# Patient Record
Sex: FEMALE | Race: WHITE | Employment: UNEMPLOYED | URBAN - METROPOLITAN AREA
[De-identification: names, ages, dates, MRNs, and addresses within clinical notes are randomized per-mention and may not be internally consistent; named-entity substitution may affect disease eponyms.]

---

## 2017-01-03 PROBLEM — L05.01 PILONIDAL CYST WITH ABSCESS: Status: ACTIVE | Noted: 2017-01-03

## 2017-01-04 ENCOUNTER — OFFICE VISIT (OUTPATIENT)
Dept: SURGERY | Age: 22
End: 2017-01-04

## 2017-01-04 VITALS
WEIGHT: 159 LBS | DIASTOLIC BLOOD PRESSURE: 71 MMHG | BODY MASS INDEX: 26.49 KG/M2 | HEIGHT: 65 IN | RESPIRATION RATE: 20 BRPM | SYSTOLIC BLOOD PRESSURE: 109 MMHG | HEART RATE: 87 BPM | TEMPERATURE: 98.6 F | OXYGEN SATURATION: 98 %

## 2017-01-04 DIAGNOSIS — Z09 POSTOPERATIVE EXAMINATION: Primary | ICD-10-CM

## 2017-01-04 NOTE — PROGRESS NOTES
The patient is status post incision and drainage of infected pilonidal cyst.  She has no complaints. She reports the area feels much better and only has minimal discomfort with prolonged sitting at present. Packing was removed as requested four days ago. On examination the site appears much less inflamed. There is minimal tenderness. There is no purulent drainage that is expressed. The opening is about 1 cm long. Dry dressing applied to the site. The patient will continue bid Sitz baths for three more days and then simply shower daily. When there is no more drainage, no further dressing is needed. As we discussed at her last visit, I would recommend planning for elective excision of the pilonidal cyst once the area is fully healed. This could be done as early as six to eight weeks from now if she wishes. 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 I reviewed typical operative and post operative course for excision of pilonidal cyst.  The biggest risk is risk of difficulty healing of the wound. At surgery either primary closure or open packing with closure by secondary intention is possible. Risk of surgery includes bleeding, infection, recurrence of pilonidal cyst and nonhealing of wound. She will call the office whenever she wishes to proceed with that surgery. Potential for recurrence of infection in the meantime was reviewed. Final Diagnosis:  Status post incision and drainage of infected pilonidal cyst, post-operative visit.     Lawrence/keith     cc: Ewa Page MD

## 2017-01-04 NOTE — MR AVS SNAPSHOT
Visit Information Date & Time Provider Department Dept. Phone Encounter #  
 1/4/2017  9:40 AM Rodrigo Luevano MD [facility] include [facility] beds at the [facility] [address] [phone] [id number] Upcoming Health Maintenance Date Due  
 HPV AGE 9Y-34Y (1 of 3 - Female 3 Dose Series) 7/17/2006 DTaP/Tdap/Td series (1 - Tdap) 7/17/2016 PAP AKA CERVICAL CYTOLOGY 7/17/2016 INFLUENZA AGE 9 TO ADULT 8/1/2016 Allergies as of 1/4/2017  Review Complete On: 1/4/2017 By: Rodrigo Luevano MD  
 No Known Allergies Current Immunizations  Never Reviewed No immunizations on file. Not reviewed this visit You Were Diagnosed With   
  
 Codes Comments Postoperative examination    -  Primary ICD-10-CM: F09 ICD-9-CM: V67.00 Vitals BP Pulse Temp Resp Height(growth percentile) Weight(growth percentile) 109/71 (BP 1 Location: Right arm, BP Patient Position: Sitting) 87 98.6 °F (37 °C) (Oral) 20 5' 5\" (1.651 m) 159 lb (72.1 kg) SpO2 BMI OB Status Smoking Status 98% 26.46 kg/m2 Having regular periods Never Smoker BMI and BSA Data Body Mass Index Body Surface Area  
 26.46 kg/m 2 1.82 m 2 Your Updated Medication List  
  
   
This list is accurate as of: 1/4/17 10:27 AM.  Always use your most recent med list.  
  
  
  
  
 desogestrel-ethinyl estradiol 0.15-0.03 mg Tab Commonly known as:  DESOGEN Take 1 Tab by mouth daily. HYDROcodone-acetaminophen 7.5-325 mg per tablet Commonly known as:  Karimi Chloé Take 1 Tab by mouth every four (4) hours as needed for Pain. ibuprofen 200 mg tablet Commonly known as:  MOTRIN Take 200 mg by mouth every six (6) hours as needed. Indications: PAIN  
  
 oxyCODONE-acetaminophen 5-325 mg per tablet Commonly known as:  PERCOCET Take 1-2 Tabs by mouth every four (4) hours as needed for Pain. SULFAMETHOXAZOLE PO Take  by mouth two (2) times a day. Introducing [facility]! Lynn Carpio introduces [employer] patient portal. Now you can access parts of your medical record, email your doctor's office, and request medication refills online. 1. In your internet browser, go to [email or web address] 2. Click on the First Time User? Click Here link in the Sign In box. You will see the New Member Sign Up page. 3. Enter your [employer] Access Code exactly as it appears below. You will not need to use this code after youve completed the sign-up process. If you do not sign up before the expiration date, you must request a new code. · [employer] Access Code: [email or web address] Expires: 3/29/2017 10:41 AM 
 
4. Enter the last four digits of your Social Security Number (xxxx) and Date of Birth (mm/dd/yyyy) as indicated and click Submit. You will be taken to the next sign-up page. 5. Create a [employer] ID. This will be your [employer] login ID and cannot be changed, so think of one that is secure and easy to remember. 6. Create a [employer] password. You can change your password at any time. 7. Enter your Password Reset Question and Answer. This can be used at a later time if you forget your password. 8. Enter your e-mail address. You will receive e-mail notification when new information is available in [address] 9. Click Sign Up. You can now view and download portions of your medical record. 10. Click the Download Summary menu link to download a portable copy of your medical information. If you have questions, please visit the Frequently Asked Questions section of the [employer] website. Remember, [employer] is NOT to be used for urgent needs. For medical emergencies, dial 911. Now available from your iPhone and Android! Please provide this summary of care documentation to your next provider. Your primary care clinician is listed as [address] [facility]. If you have any questions after today's visit, please call [phone].